# Patient Record
Sex: MALE | Race: AMERICAN INDIAN OR ALASKA NATIVE
[De-identification: names, ages, dates, MRNs, and addresses within clinical notes are randomized per-mention and may not be internally consistent; named-entity substitution may affect disease eponyms.]

---

## 2018-06-16 ENCOUNTER — HOSPITAL ENCOUNTER (EMERGENCY)
Dept: HOSPITAL 14 - H.ER | Age: 23
Discharge: HOME | End: 2018-06-16
Payer: MEDICAID

## 2018-06-16 VITALS
TEMPERATURE: 98.7 F | HEART RATE: 57 BPM | RESPIRATION RATE: 16 BRPM | DIASTOLIC BLOOD PRESSURE: 64 MMHG | OXYGEN SATURATION: 99 % | SYSTOLIC BLOOD PRESSURE: 117 MMHG
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
## 2018-06-16 DIAGNOSIS — Z23: ICD-10-CM

## 2018-06-16 DIAGNOSIS — S60.811A: Primary | ICD-10-CM
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
## 2018-06-16 NOTE — ED PDOC
HPI: General Adult


Time Seen by Provider: 06/16/18 12:59


Chief Complaint (Nursing): Abnormal Skin Integrity


Chief Complaint (Provider): skin abrasion


History Per: Patient (24 y/o male here with wrist injury today after being 

scratched wellington nail on friday.  Unsure of last vaccination. Patient is here 

for tetanus vaccination.)





Past Medical History


Reviewed: Historical Data, Nursing Documentation, Vital Signs


Vital Signs: 





 Last Vital Signs











Temp  98.7 F   06/16/18 12:55


 


Pulse  57 L  06/16/18 12:55


 


Resp  16   06/16/18 12:55


 


BP  117/64   06/16/18 12:55


 


Pulse Ox  99   06/16/18 12:55














- Family History


Family History: States: No Known Family Hx





- Allergies


Allergies/Adverse Reactions: 


 Allergies











Allergy/AdvReac Type Severity Reaction Status Date / Time


 


banana Allergy  NAUSEA Verified 06/16/18 12:55














Review of Systems


ROS Statement: Except As Marked, All Systems Reviewed And Found Negative





Physical Exam





- Reviewed


Nursing Documentation Reviewed: Yes


Vital Signs Reviewed: Yes





- Physical Exam


Appears: Positive for: Well, Non-toxic, No Acute Distress


Head Exam: Positive for: ATRAUMATIC, NORMAL INSPECTION, NORMOCEPHALIC


Skin: Positive for: Normal Color (No skin injury visible), Warm


Eye Exam: Positive for: EOMI, Normal appearance, PERRL


ENT: Positive for: Normal ENT Inspection


Neck: Positive for: Normal, Painless ROM


Cardiovascular/Chest: Positive for: Regular Rate, Rhythm


Respiratory: Positive for: CNT, Normal Breath Sounds


Gastrointestinal/Abdominal: Positive for: Normal Exam, Soft


Back: Positive for: Normal Inspection


Extremity: Positive for: Normal ROM


Neurologic/Psych: Positive for: Alert, Oriented





- ECG


O2 Sat by Pulse Oximetry: 99





- Progress


ED Course And Treament: 





Tdap 0.5 ml IM x 1 dose





Disposition





- Clinical Impression


Clinical Impression: 


 Skin abrasion








- Patient ED Disposition


Is Patient to be Admitted: No





- Disposition


Referrals: 


[facility] [Outside]


Disposition: Routine/Home


Disposition Time: 13:04


Condition: FAIR


Instructions:  Skin Abrasions (DC), Tdap Vaccine